# Patient Record
Sex: FEMALE | Race: WHITE
[De-identification: names, ages, dates, MRNs, and addresses within clinical notes are randomized per-mention and may not be internally consistent; named-entity substitution may affect disease eponyms.]

---

## 2018-08-24 ENCOUNTER — HOSPITAL ENCOUNTER (INPATIENT)
Dept: HOSPITAL 68 - ERH | Age: 73
LOS: 3 days | DRG: 176 | End: 2018-08-27
Attending: INTERNAL MEDICINE | Admitting: HOSPITALIST
Payer: COMMERCIAL

## 2018-08-24 VITALS — WEIGHT: 166 LBS | BODY MASS INDEX: 29.41 KG/M2 | HEIGHT: 63 IN

## 2018-08-24 DIAGNOSIS — M81.0: ICD-10-CM

## 2018-08-24 DIAGNOSIS — E66.9: ICD-10-CM

## 2018-08-24 DIAGNOSIS — G47.33: ICD-10-CM

## 2018-08-24 DIAGNOSIS — E11.9: ICD-10-CM

## 2018-08-24 DIAGNOSIS — F32.9: ICD-10-CM

## 2018-08-24 DIAGNOSIS — I10: ICD-10-CM

## 2018-08-24 DIAGNOSIS — F41.9: ICD-10-CM

## 2018-08-24 DIAGNOSIS — K21.9: ICD-10-CM

## 2018-08-24 DIAGNOSIS — I26.99: Primary | ICD-10-CM

## 2018-08-24 LAB
ABSOLUTE GRANULOCYTE CT: 3.2 /CUMM (ref 1.4–6.5)
APTT BLD: 30 SEC (ref 25–37)
BASOPHILS # BLD: 0 /CUMM (ref 0–0.2)
BASOPHILS NFR BLD: 0.3 % (ref 0–2)
EOSINOPHIL # BLD: 0.1 /CUMM (ref 0–0.7)
EOSINOPHIL NFR BLD: 1.5 % (ref 0–5)
ERYTHROCYTE [DISTWIDTH] IN BLOOD BY AUTOMATED COUNT: 13.2 % (ref 11.5–14.5)
GRANULOCYTES NFR BLD: 60.6 % (ref 42.2–75.2)
HCT VFR BLD CALC: 37.8 % (ref 37–47)
LYMPHOCYTES # BLD: 1.7 /CUMM (ref 1.2–3.4)
MCH RBC QN AUTO: 30.6 PG (ref 27–31)
MCHC RBC AUTO-ENTMCNC: 34.5 G/DL (ref 33–37)
MCV RBC AUTO: 88.6 FL (ref 81–99)
MONOCYTES # BLD: 0.3 /CUMM (ref 0.1–0.6)
PLATELET # BLD: 237 /CUMM (ref 130–400)
PMV BLD AUTO: 7.1 FL (ref 7.4–10.4)
PROTHROMBIN TIME: 11.8 SEC (ref 9.4–12.5)
RED BLOOD CELL CT: 4.27 /CUMM (ref 4.2–5.4)
WBC # BLD AUTO: 5.4 /CUMM (ref 4.8–10.8)

## 2018-08-24 NOTE — CT SCAN REPORT
EXAMINATION:
CT ANGIOGRAM OF THE CHEST WITH AND WITHOUT CONTRAST (CT PULMONARY ANGIOGRAM
FOR PE)
 
CLINICAL INFORMATION:
sob elevated d-dimer
 
COMPARISON:
CT chest 07/11/2016.
 
TECHNIQUE:
Prior to contrast administration, noncontrast localization images were
obtained. Subsequently, multidetector volumetric imaging was performed from
the thoracic inlet to below the diaphragms following the administration of 95
mL Optiray 320 intravenous contrast.
No contrast reaction reported.
Sagittal, coronal, and MIP oblique sagittal reformatted images were obtained
on the CT workstation, uploaded to PACS, and reviewed.
Total exam dose-length product 329.4 mGy-cm.
 
FINDINGS:
 
QUALITY OF STUDY/CONTRAST BOLUS: Satisfactory
 
PULMONARY ARTERIES: There is bilateral pulmonary emboli. There is large
emboli at the distal left main pulmonary artery with extension into secondary
tertiary and peripheral branches of the upper lobe lingula and lower lobe.
There is also a large pulmonary embolus of the distal right main pulmonary
artery extending into the upper lobe middle lobe and lower lobe.
 
THORACIC AORTA: No aneurysm or dissection.
 
LUNG: No focal consolidation, nodules or masses.
 
PLEURA: No pleural effusion or pneumothorax.
 
MEDIASTINUM: Normal heart size. No pericardial effusion. No hilar or
mediastinal lymphadenopathy. No evidence of septal bowing or right heart
strain.
 
CHEST WALL/AXILLA: No axillary or internal mammary lymphadenopathy.
 
OSSEOUS STRUCTURES: Patient's had prior thoracoplasty of T8 and T11.
Orthopedic plate and screw present at the lower cervical spine. There is
multilevel degenerative spondylosis of the dorsal spine.
 
UPPER ABDOMEN: Sharply marginated 1.4 cm hypodensity in the right lobe of
liver and a 0.9 cm hypodensity in right lobe liver. Both of these remain
unchanged since exam 07/11/2016 likely hepatic cysts.
No reflux of contrast into the hepatic veins to suggest elevated right heart
pressures.
 
IMPRESSION:
Bilateral pulmonary emboli.
 
VTE: positive.
 
This critical result was discussed with Dr. Edmonds on 08/24/2018, 8:05 PM
and it was ascertained that the content and urgency of the report was
understood at the time of direct communication.

## 2018-08-24 NOTE — ED GENERAL ADULT
**See Addendum**
History of Present Illness
 
General
Chief Complaint: Chest Pain
Stated Complaint: CP
Source: patient
Exam Limitations: no limitations
 
Vital Signs & Intake/Output
Vital Signs & Intake/Output
 Vital Signs
 
 
Date Time Temp Pulse Resp B/P B/P Pulse O2 O2 Flow FiO2
 
     Mean Ox Delivery Rate 
 
08/24 2200  75 20 147/81  95 Room Air  
 
08/24 2000  72 18 184/78  96 Room Air  
 
08/24 1700       Nasal 2.0L 
 
       Cannula  
 
08/24 1700 98.3 76 20 173/85  95 Room Air  
 
 
 ED Intake and Output
 
 
 08/25 0000 08/24 1200
 
Intake Total 0 
 
Output Total  
 
Balance 0 
 
   
 
Intake, Oral 0 
 
Patient 165 lb 
 
Weight  
 
Weight Bed scale 
 
Measurement  
 
Method  
 
 
 
Allergies
Coded Allergies:
ACE Inhibitors (COUGH 08/24/18)
bee venom protein (honey bee) (WELTS, DYSPNEA 08/24/18)
 
Triage Note:
PT BIBA FROM PCP. PT C/O INCREASED
ANXIETY X2 WEEKS WITH ASSOCIATED SHORTNESS OF
BREATH AND CHEST PAIN. PT REPORTS WORSENING
SYMPTOMS TODAY WHILE PUMPING GAS. DENIES ANY OTHER
SYMPTOMS. REPORTS HX OF SIMILAR. PT RECEIVED 324
ASA FROM EMS. 2 L NC. PT REPORTS IMPROVEMENT OF
SYMPTOMS ON ARRIVAL
Triage Nurses Notes Reviewed? yes
Onset: Abrupt
Duration: hour(s):
Timing: single episode today
HPI:
72 year old female presents to the Emergency Department for labored breathing 
which gets worse with excertion. She states this has been going on for a few 
weeks. She also is complaining of chest pain.   states she has had a 
cough that has been on going for 1 year. She has a history of anxiety.
(Jason Erickson DO)
Reconcile Medications
Ascorbate Calcium (Vitamin C) (Unknown Strength) TABLET   (Unknown Dose) PO 
DAILY SUPPLEMENT  (Reported)
Calcium Carbonate/Vitamin D3 (Calcium 500 + D Tablet) (Unknown Strength) TABLET 
 (Unknown Dose) PO DAILY SUPPLEMENT  (Reported)
Dexlansoprazole (Dexilant) 60 MG CAP.DRAtlonBP   1 CAP PO BID GI  (Reported)
Diclofenac Sodium 75 MG TABLET.DR   1 TAB PO BID PAIN/INFLAMMATION  (Reported)
Epinephrine (Epipen 2-Campos) 0.3 MG/0.3 ML AUTO.INJCT   0.3 MG IM AD PRN ALLERGIC 
REACTION  (Reported)
Fluoxetine HCl 60 MG TABLET   1 TAB PO DAILY DEPRESSION  (Reported)
Irbesartan (Avapro) 300 MG TABLET   1 TAB PO DAILY BP  (Reported)
Nebivolol HCl (Bystolic) 10 MG TABLET   1 TAB PO DAILY BP  (Reported)
Tylenol With Codeine (Tylenol With Codeine #3 Tablet) 300 MG-30 MG TABLET   1 
TAB PO BIDP PRN PAIN  (Reported)
 
(Darby OROZCO,Curtis ARRIOLA)
 
Past History
 
Travel History
Traveled to [place] past 21 day No
 
Medical History
Any Pertinent Medical History? see below for history
Neurological: COGNITIVE IMPAIRMENT TREMOR DYSPHAGIA
EENT: NONE
Cardiovascular: hypertension
Respiratory: obstructive sleep apnea
Gastrointestinal: diverticulitis, GERD
Hepatic: NONE
Renal: NONE
Musculoskeletal: osteoporosis, ARTHRITIS
Psychiatric: anxiety, depression
Endocrine: NONE
Blood Disorders: NONE
Cancer(s): NONE
GYN/Reproductive: NONE
 
Surgical History
Surgical History: non-contributory
 
Psychosocial History
Who do you live with Spouse
Services at Home None
What is your primary language English
Tobacco Use: Never used
 
Family History
Hx Contributory? No
(Jason Erickson DO)
 
Review of Systems
 
Review of Systems
Constitutional:
Reports: see HPI.  Denies: fever. 
EENTM:
Reports: no symptoms.  Denies: double vision, visual changes. 
Respiratory:
Reports: see HPI, cough, short of breath.  Denies: hemoptysis. 
Cardiovascular:
Reports: see HPI, chest pain.  Denies: edema. 
GI:
Reports: no symptoms. 
Genitourinary:
Reports: no symptoms. 
Musculoskeletal:
Reports: no symptoms. 
Skin:
Reports: no symptoms. 
Neurological/Psychological:
Reports: no symptoms. 
Hematologic/Endocrine:
Reports: no symptoms. 
Immunologic/Allergic:
Reports: no symptoms. 
All Other Systems: Reviewed and Negative
(Jason Erickson DO)
 
Physical Exam
 
Physical Exam
General Appearance: alert, awake, anxious, mild distress
Head: atraumatic, normal appearance
Eyes:
Bilateral: normal appearance, PERRL, EOMI. 
Ears, Nose, Throat: normal ENT inspection
Neck: normal inspection
Respiratory: no respiratory distress, lungs clear
Cardiovascular: regular rate/rhythm
Peripheral Pulses:
4+ radial (R), 4+ radial (L)
Gastrointestinal: soft, non-tender
Back: normal inspection
Extremities: normal inspection, no edema
Neurologic/Psych: no motor/sensory deficits, awake, alert, oriented x 3
Skin: intact, normal color, warm/dry
 
Core Measures
ACS in differential dx? No
CVA/TIA Diagnosis: No
Sepsis Present: No
Sepsis Focused Exam Completed? No
(Jason Erickson DO)
 
Physical Exam
Rectal: normal exam, heme negative stool
(Darby OROZCO,Curtis ARRIOLA)
 
Progress
Differential Diagnoses
I considered the following diagnoses in my evaluation of the patient: 
 
Plan of Care:
 Orders
 
 
Procedure Date/time Status
 
Regular Diet 08/25 B Active
 
PARTIAL THROMBOPLASTIN TIME 08/25 0200 Active
 
Weight 08/24 2256 Active
 
Vital Signs 08/24 2256 Active
 
Teach/Educate 08/24 2256 Active
 
Pain Treatment and Response 08/24 2256 Active
 
Nutritional Intake, Monitor 08/24 2256 Active
 
Isolation 08/24 2256 Active
 
Intake & Output 08/24 2256 Active
 
Heparin Drip- AFIB/FLUTTER/PE/ 08/24 2256 Active
 
Patient Care Conference 08/24 2256 Active
 
Activity/Ambulation 08/24 2256 Active
 
Pathway - chart 08/24 2203 Active
 
House Staff 08/24 2203 Active
 
Code Status 08/24 2203 Active
 
Patient Data 08/24 2111 Active
 
Saline Lock 08/24 2106 Active
 
Misc Message 08/24 2106 Active
 
ED Holding Orders 08/24 2106 Active
 
Admit to inpatient 08/24 2106 Active
 
Vital Signs 08/24 2106 Active
 
Code Status 08/24 2106 Complete
 
ECHOCARDIOGRAM 08/24 2105 Active
 
TROPONIN LEVEL 08/24 2100 Complete
 
EKG 08/24 2100 Active
 
Add-on Test (ER Only) 08/24 2005 Active
 
Add-on Test (ER Only) 08/24 1756 Active
 
TROPONIN LEVEL 08/24 1715 Complete
 
PARTIAL THROMBOPLASTIN TIME 08/24 1715 Complete
 
PROTHROMBIN TIME 08/24 1715 Complete
 
D-DIMER 08/24 1715 Complete
 
COMPREHENSIVE METABOLIC PANEL 08/24 1715 Complete
 
CBC WITHOUT DIFFERENTIAL 08/24 1715 Complete
 
EKG 08/24 1700 Active
 
Intake & Output 08/24 1657 Active
 
Telemetry/Cardiac Monitor 08/24  UNK Active
 
Activity/Ambulation 08/24  UNK Active
 
 
 Current Medications
 
 
  Sig/Marcia Start time  Last
 
Medication Dose  Stop Time Status Admin
 
Fluoxetine HCl 60 MG DAILY 08/25 0900 AC 
 
(Prozac)     
 
Losartan Potassium 100 MG DAILY 08/25 0900 AC 
 
(Cozaar)     
 
Nebivolol 10 MG DAILY 08/25 0900 AC 
 
(Bystolic)     
 
Omeprazole 40 MG BID 08/25 0900 AC 
 
(Prilosec)     
 
Acetaminophen 650 MG Q6PRN PRN 08/24 2200 AC 
 
(Tylenol)     
 
Heparin Sodium  25,000 UNIT Q24H 1945 AC 08/24
 
(Porcine)     2003
 
(Heparin)     
 
Sodium Chloride 500 ML    
 
 
 Laboratory Tests
 
 
 
08/24/18 2124:
Troponin I < 0.01
 
08/24/18 1715:
Anion Gap 8, Estimated GFR > 60, BUN/Creatinine Ratio 23.8, Glucose 104  H, 
Calcium 9.5, Total Bilirubin 0.3, AST 23, ALT 34, Alkaline Phosphatase 83, 
Troponin I < 0.01, Total Protein 7.2, Albumin 4.1, Globulin 3.1, Albumin/
Globulin Ratio 1.3, PT 11.8, INR 1.08, APTT 30, D-Dimer High Sensitivty 2052  H,
CBC w Diff NO MAN DIFF REQ, RBC 4.27, MCV 88.6, MCH 30.6, MCHC 34.5, RDW 13.2, 
MPV 7.1  L, Gran % 60.6, Lymphocytes % 31.9, Monocytes % 5.7, Eosinophils % 1.5,
Basophils % 0.3, Absolute Granulocytes 3.2, Absolute Lymphocytes 1.7, Absolute 
Monocytes 0.3, Absolute Eosinophils 0.1, Absolute Basophils 0
 
Initial ED EKG: NSR, LVH
Prior EKG: unchanged
Comments:
 
 
 
 
The patient had an elevated d-dimer and CTA was ordered to rule out pulmonary 
embolism.  The patient was signed out to Dr. Pastor at 7 PM.
(Jason Erickson DO)
 
Departure
 
Departure
Condition: Stable
Referrals:
Adrian OROZCO,Jairo SHARP (PCP/Family)
 
Departure Forms:
Customer Survey
General Discharge Information
(Jason Erickson DO)
 
Departure
Disposition: STILL A PATIENT
Clinical Impression
Primary Impression: Pulmonary emboli
Comments
8/24/18, 20:06... discussed with [facility] regarding presence of right
heart strain... radiologist reports to me that there is bilateral clot burden 
distally at branching points of pulmonary arteries but no sign of right heart 
strain, no saddle pulmonary emboli.  Of note, patient has had symptoms for 3 
weeks.
 
Admission Note
Spoke With:
Herb Mckinley MD
Documentation of Exam:
Documentation of any treatments & extenuating circumstances including Concerns 
Regarding Discharge (functional status, medication knowledge or non-compliance, 
living conditions, etc.) that warrant an admission rather than observation: 
 
pt with bilateral PE without right heart strain, stable on room air with stable 
vitals. comfortable in ED. ... pt merits tele, iv heparin.  
 
(Darby OROZCO,Curtis ARRIOLA)
 
Critical Care Note
 
Critical Care Note
Critical Care Time: non-applicable
(Jason Erickson DO)
 
Critical Care Note
Critical Care Time: 30-74 min
(Darby OROZCO,Curtis ARRIOLA)

## 2018-08-24 NOTE — HISTORY & PHYSICAL
Rafael Yu 08/24/18 2111:
General Information and HPI
MD Statement:
I have seen and personally examined DONY NAPIER and documented this H&P.
 
The patient is a 72 year old F who presented with a patient stated chief 
complaint of [progressive shortness of breath on exertion x 4 weeks].
 
Source of Information: patient, old records
Exam Limitations: no limitations
History of Present Illness:
Mrs Napier is a 72F with a PMH of cognitive impairment, hypertension, obesity
, diabetes mellitus, GERD, osteoporosis, OA, anxiety, depression presents with 
shortness of breath on exertion x 4 weeks progressively worsening. Pt states 
that over the past year she has had a dry nonproductive cough, which worsened in
association with progressively worsening shortness of breath. States that she 
also has been experiencing chest pain that radiates into bilateral shoulder x 4 
weeks. Denies any inciting incident, rather woke up one day and noticed that she
felt more out of breath when walking up the stairs. States most recently she was
at a concert thursday night, when she became out of breath walking to a 
[place] and back, roughly fifty feet. At baseline, she can climb several 
flights of stairs or walk >5 city blocks without shortness of breath. Recently, 
pt and her  flew to [place] 1 week ago, approx 1.5 hour flight but has 
not had any recent periods of immobilization. Had epidural shot approx 5 weeks 
ago. No recent surgery. Denies any recent sick contacts. 
 
She denies fevers, chills, recent weight loss, decreased appetite, palpitations,
hemoptysis, shortness of breath at rest,  nausea/vomiting/diarrhea, urinary 
symptoms, lower extremity edema
 
She admits to worsened headaches, night sweats and ?stomach aches.
 
 
 
Allergies/Medications
Allergies:
Coded Allergies:
ACE Inhibitors (COUGH 08/24/18)
bee venom protein (honey bee) (WELTS, DYSPNEA 08/24/18)
 
 
Past History
 
Travel History
Traveled to [place] past 21 day No
 
Medical History
Neurological: COGNITIVE IMPAIRMENT TREMOR DYSPHAGIA
EENT: NONE
Cardiovascular: hypertension
Respiratory: obstructive sleep apnea
Gastrointestinal: diverticulitis, GERD
Hepatic: NONE
Renal: NONE
Musculoskeletal: osteoporosis, ARTHRITIS
Psychiatric: anxiety, depression
Endocrine: NONE
Blood Disorders: NONE
Cancer(s): NONE
GYN/Reproductive: NONE
 
Surgical History
Surgical History: non-contributory
 
Past Family/Social History
 
Psychosocial History
Services at Home: None
Smoking Status: Never Smoked
ETOH Use: occasional use
Illicit Drug Use: denies illicit drug use
 
Functional Ability
ADLs
Independent: dressing, eating, toileting, bathing. 
Ambulation: independent
IADLs
Independent: shopping, housework, finances, food prep, telephone, transportation
, medication admin. 
 
Employment History
Employment Retired
Profession/Employer owned a retail store
 
Review of Systems
 
Review of Systems
Constitutional:
Reports: see HPI, weakness.  Denies: diaphoresis, fever, malaise, unexplained 
weight loss. 
EENTM:
Denies: blurred vision, double vision, visual changes. 
Cardiovascular:
Reports: chest pain.  Denies: edema, orthopena, palpitations, peripheral edema. 
Respiratory:
Reports: cough, short of breath.  Denies: hemoptysis, sputum production, 
wheezing. 
GI:
Reports: abdominal pain.  Denies: bloating, constipation, diarrhea, distention, 
bowel incontinence, melena, nausea, bloody stool. 
Genitourinary:
Reports: no symptoms. 
 
Exam & Diagnostic Data
Last 24 Hrs of Vital Signs/I&O
 Vital Signs
 
 
Date Time Temp Pulse Resp B/P B/P Pulse O2 O2 Flow FiO2
 
     Mean Ox Delivery Rate 
 
08/24 2200  75 20 147/81  95 Room Air  
 
08/24 2000  72 18 184/78  96 Room Air  
 
08/24 1700       Nasal 2.0L 
 
       Cannula  
 
08/24 1700 98.3 76 20 173/85  95 Room Air  
 
 
 
 
Physical Exam
General Appearance Alert, Oriented X3, Cooperative, No Acute Distress
Skin No Rashes
Skin Temp/Moisture Exam: Warm/Dry
Neck No JVD
Cardiovascular Regular Rate, Normal S1, Normal S2
Lungs Clear to Auscultation, Normal Air Movement
Abdomen Soft, No Tenderness
Neurological Normal Speech, Strength at 5/5 X4 Ext, Sensation Intact
Extremities No Edema, Normal Pulses
Vascular Pulses Symmetrical
Last 24 Hrs of Labs/Andre:
 Laboratory Tests
 
08/24/18 2124:
Troponin I < 0.01
 
08/24/18 1715:
Anion Gap 8, Estimated GFR > 60, BUN/Creatinine Ratio 23.8, Glucose 104  H, 
Calcium 9.5, Total Bilirubin 0.3, AST 23, ALT 34, Alkaline Phosphatase 83, 
Troponin I < 0.01, Total Protein 7.2, Albumin 4.1, Globulin 3.1, Albumin/
Globulin Ratio 1.3, PT 11.8, INR 1.08, APTT 30, D-Dimer High Sensitivty 2052  H,
CBC w Diff NO MAN DIFF REQ, RBC 4.27, MCV 88.6, MCH 30.6, MCHC 34.5, RDW 13.2, 
MPV 7.1  L, Gran % 60.6, Lymphocytes % 31.9, Monocytes % 5.7, Eosinophils % 1.5,
Basophils % 0.3, Absolute Granulocytes 3.2, Absolute Lymphocytes 1.7, Absolute 
Monocytes 0.3, Absolute Eosinophils 0.1, Absolute Basophils 0
 
 
Assessment/Plan
Assessment:
Mrs Napier is a 72F with PMH cognitive impairment, hypertension, obesity, 
diabetes mellitus, GERD, osteoporosis, OA, anxiety, depression presents with 
shortness of breath on exertion worsening x4 weeks. She had an elevated d-dimer 
of 2052, and was found to have bilateral pulmonary emboli on CTA chest. She is 
up to date on most of her screening exams, however has not had any recent long 
term immobilization or risk factors for PE.
 
Problem List:
Unprovoked PE 
Chronic Medical Conditions
 
#B/L Pulmonary Embolus
-Admit to tele
-On IV heparin drip
-Trop and EKG negative x2; EKG and Trop at 3am
-Echo for R heart strain in AM
-On 2L with sats at 95%; taper accordingly
-B/L lower extremity doppler negative
 
#Chronic medical conditions
-Continue home meds
 
DVT PPX: ALPS and Heparin
IV Access
Regular Diet
Full Code
Dispo: likely to home on long term anticoagulation
 
As Ranked By This Provider
Problem List:
 1. Pulmonary emboli
 
 
Core Measures/Misc (9/17)
 
Acute Coronary Syndrome
ACS Diagnosis: No
 
Congestive Heart Failure
Congestive Heart Failure Diagnosis No
 
Cerebrovascular Accident
CVA/TIA Diagnosis: No
 
VTE (View Protocol)
VTE Risk Factors Age>40
No Mechanical VTE Prophylaxis d/t N/A MechProphylax Ordered
No VTE Pharm Prophylaxis d/t NA PharmProphylax ordered
 
Sepsis (View protocol)
Sepsis Present: No
If YES complete Sepsis Event Note If YES complete Sepsis Event Note
 
Avery OROZCO,Coty 08/24/18 2114:
General Information and HPI
 
Allergies/Medications
Home Med list
Ascorbate Calcium (Vitamin C) (Unknown Strength) TABLET   (Unknown Dose) PO 
DAILY SUPPLEMENT  (Reported)
Calcium Carbonate/Vitamin D3 (Calcium 500 + D Tablet) (Unknown Strength) TABLET 
 (Unknown Dose) PO DAILY SUPPLEMENT  (Reported)
Dexlansoprazole (Dexilant) 60 MG CAP.DR.BP   1 CAP PO BID GI  (Reported)
Diclofenac Sodium 75 MG TABLET.DR   1 TAB PO BID PAIN/INFLAMMATION  (Reported)
Epinephrine (Epipen 2-Campos) 0.3 MG/0.3 ML AUTO.INJCT   0.3 MG IM AD PRN ALLERGIC 
REACTION  (Reported)
Fluoxetine HCl 60 MG TABLET   1 TAB PO DAILY DEPRESSION  (Reported)
Irbesartan (Avapro) 300 MG TABLET   1 TAB PO DAILY BP  (Reported)
Nebivolol HCl (Bystolic) 10 MG TABLET   1 TAB PO DAILY BP  (Reported)
Tylenol With Codeine (Tylenol With Codeine #3 Tablet) 300 MG-30 MG TABLET   1 
TAB PO BIDP PRN PAIN  (Reported)
 
 
 
Core Measures/Misc (9/17)
 
Sepsis (View protocol)
If YES complete Sepsis Event Note If YES complete Sepsis Event Note
 
Resident Review Statement
Resident Statement: examined this patient, discussed with intern, agreed with 
intern, discussed with family, reviewed EMR data (avail), discussed with nursing
, reviewed images
Other Findings:
Ms. Napier is a 72-year-old lady with past history significant cognitive 
impairment, hypertension, obesity, diabetes mellitus, GERD, osteoporosis, OA, 
anxiety, depression presents with shortness of breath.
Patient reports shortness of breath that has been going on for the past 5 weeks.
SOB is mostly exertional.  Has a chronic dry cough which has been extensively 
worked up in the past.  She also complains of chest pain started the symptoms 
shortness of breath.  Denies any lightheadedness/dizziness, palpitations, or 
syncopal episodes. Patient had a flight to [place] a week ago(1.5 +1.5 hrs 
connecting flight).  Also denies any weight loss. Has been up to date with all 
her screenings(colonoscopy, mammograms, pap smears).  She has never smoked in 
the past and does not have any family hx of blood clots or cancers. 
 
Vitals on admission were temperature 98.3, heart rate 76, respiratory rate 20, 
/85 and O2 sats 95% on room air.
 
Labs were significant for d-dimer of 2052, CPC and BP are unremarkable.
 
CTA chest was done showing bilateral pulmonary emboli.
 
Problem List;
1. Unprovoked PE
2. Chronic medical conditions
 
- Admit the patient to telemetry floor
- Start the patient on IV heparin drip
- Bilateral lower extremity ultrasound to rule out any DVT
- Troponin and EKG x 3
- Echocardiogram to look for right heart strain
- Patient currently on 2 L of supplemental oxygen, will continue and taper off 
gradually.
- Continue home medication
 
DVT Prophylaxis; ALPS and IV heparin
Patient is full code.
 
Herb Mckinley MD 08/25/18 0416:
General Information and HPI
MD Statement:
I have seen and personally examined DONY NAPIER and documented this H&P.
 
The patient is a 72 year old F who presented with a patient stated chief 
complaint of [].
 
 
 
Core Measures/Misc (9/17)
 
Sepsis (View protocol)
If YES complete Sepsis Event Note If YES complete Sepsis Event Note
 
Attending MD Review Statement
 
Attending Statement
Attending MD Statement: examined this patient, discuss w/resident/PA/NP, agreed 
w/resident/PA/NP
Attending Assessment/Plan:
72 year-old female presents with gradual onset dyspnea, acutely worse in the 
last 24 hours leading up to presentation. History of cognitive impairment, 
obesity, and diabetes. Oxygenation was stable, as was heart rate, but D-dimer 
was significantly elevated, leading to a CT chest which showed a fairly 
extensive clot burden, occluding the left main pulmonary artery with extension 
distally into smaller branches; also involving the distal right main pulmonary 
artery. Hepatic cysts noted on the liver with stability from about 2 years 
prior. No clear inciting etiology for the PE that by history would seem to have 
started to become symptomatic approximately 2 weeks ago. No pleurisy or 
hemoptysis. 
Admit to inpatient for treatment of unprovoked, extensive PE with IV heparin, 
telemetry monitoring, and given this degree of clot burden, would prefer to 
check Doppler of the legs in this case. An echo might delineate any degree of 
right heart strain not picked up by CT/EKG. May be a candidate for NOAC as 
outpatient. 
 
Dr. Herb Mckinley

## 2018-08-24 NOTE — RADIOLOGY REPORT
EXAMINATION:
XR PORTABLE CHEST
 
CLINICAL INFORMATION:
Chest pain.
 
COMPARISON:
Chest x-ray 08/09/2018
 
TECHNIQUE:
Portable frontal view of the chest was obtained. 5:09 PM
 
FINDINGS:
The lungs are clear. There is no pulmonary vascular congestion. There is no
pleural effusion. The cardiac and mediastinal contours are normal. The heart
size is normal.
 
Orthopedic plate and screw present in the lower cervical spine.
 
IMPRESSION:
No acute abnormality of the chest.

## 2018-08-24 NOTE — ULTRASOUND REPORT
EXAMINATION:
US TRIPLEX OF LOWER EXTREMITIES, BILATERAL
 
CLINICAL INFORMATION:
Bilateral pulmonary emboli.
 
COMPARISON:
None
 
TECHNIQUE:
Color-flow triplex imaging with spectral analysis and compression Doppler
were performed on the lower extremities.
 
FINDINGS:
Respiratory variation, normal compression and augmented flow are noted
throughout the lower extremities. The visualized common femoral vein,
superficial femoral vein, profunda femoral vein, popliteal vein and midcalf
peroneal and posterior tibial venous segments show no evidence of deep venous
thrombosis. There is no Baker's cyst.
 
IMPRESSION:
No evidence of deep venous thrombosis involving the bilateral lower
extremities.

## 2018-08-25 VITALS — DIASTOLIC BLOOD PRESSURE: 72 MMHG | SYSTOLIC BLOOD PRESSURE: 120 MMHG

## 2018-08-25 VITALS — SYSTOLIC BLOOD PRESSURE: 146 MMHG | DIASTOLIC BLOOD PRESSURE: 80 MMHG

## 2018-08-25 VITALS — SYSTOLIC BLOOD PRESSURE: 140 MMHG | DIASTOLIC BLOOD PRESSURE: 80 MMHG

## 2018-08-25 LAB
APTT BLD: 74 SEC (ref 25–37)
APTT BLD: > 120 SEC (ref 25–37)

## 2018-08-25 NOTE — PN- HOUSESTAFF
Avery OROZCO,[facility] 18 0549:
Subjective
Follow-up For:
Bilateral unprovoked PE
Subjective:
Patient feels better this am, does not have any SOB even though has not walked 
around much yet. Continues to have mild chest pain. Had bad back last night 
improved wit tylenol. 
 
Review of Systems
Constitutional:
Reports: no symptoms. 
EENTM:
Reports: no symptoms. 
Cardiovascular:
Reports: chest pain. 
Respiratory:
Reports: short of breath. 
Gastrointestinal:
Reports: no symptoms. 
Genitourinary:
Reports: no symptoms. 
Musculoskeletal:
Reports: no symptoms. 
 
Objective
Last 24 Hrs of Vital Signs/I&O
 Vital Signs
 
 
Date Time Temp Pulse Resp B/P B/P Pulse O2 O2 Flow FiO2
 
     Mean Ox Delivery Rate 
 
 0600 98.4 78 20 120/72  96 Room Air  
 
 0000       Room Air  
 
 2200  75 20 147/81  95 Room Air  
 
 2000  72 18 184/78  96 Room Air  
 
 1700       Nasal 2.0L 
 
       Cannula  
 
 170 98.3 76 20 173/85  95 Room Air  
 
 
 Intake & Output
 
 
  08 0000  1600
 
Intake Total 240 0 
 
Output Total   
 
Balance 240 0 
 
    
 
Intake,   
 
Intake, Oral 120 0 
 
Patient  165 lb 
 
Weight   
 
Weight  Bed scale 
 
Measurement   
 
Method   
 
 
 
 
Physical Exam
General Appearance: Alert, Oriented X3, Cooperative, No Acute Distress
Skin: No Rashes, No Breakdown
Cardiovascular: Regular Rate, Normal S1, Normal S2
Lungs: Clear to Auscultation, Normal Air Movement
Abdomen: Normal Bowel Sounds, Soft, No Tenderness
Extremities: No Clubbing, No Cyanosis, No Edema
Current Medications:
 Current Medications
 
 
  Sig/Marcia Start time  Last
 
Medication Dose Route Stop Time Status Admin
 
Acetaminophen 650 MG Q6PRN PRN 2200 AC 
 
  PO   0457
 
Fluoxetine HCl 60 MG DAILY 900 AC 
 
  PO   
 
Heparin Sodium  0 .STK-MED ONE 1949 DC 
 
(Porcine)  .ROUTE   
 
Heparin Sodium  5,000 UNIT ONCE ONE 1945 DC 
 
(Porcine)  IV 
 
Heparin Sodium  25,000 UNIT Q24H 1945 AC 
 
(Porcine)  IV   
 
Sodium Chloride 500 ML    
 
Losartan Potassium 100 MG DAILY 900 AC 
 
  PO   
 
Nebivolol 10 MG DAILY 900 AC 
 
  PO   
 
Omeprazole 40 MG BID 08/25 0900 AC 
 
  PO   
 
 
 
 
Last 24 Hrs of Lab/Andre Results
Last 24 Hrs of Labs/Mics:
 Laboratory Tests
 
18 0617:
Troponin I Pending
 
18 0200:
APTT > 120 *H
 
184:
Troponin I < 0.01
 
18 1715:
Anion Gap 8, Estimated GFR > 60, BUN/Creatinine Ratio 23.8, Glucose 104  H, 
Calcium 9.5, Total Bilirubin 0.3, AST 23, ALT 34, Alkaline Phosphatase 83, 
Troponin I < 0.01, Total Protein 7.2, Albumin 4.1, Globulin 3.1, Albumin/
Globulin Ratio 1.3, PT 11.8, INR 1.08, APTT 30, D-Dimer High Sensitivty   H,
CBC w Diff NO MAN DIFF REQ, RBC 4.27, MCV 88.6, MCH 30.6, MCHC 34.5, RDW 13.2, 
MPV 7.1  L, Gran % 60.6, Lymphocytes % 31.9, Monocytes % 5.7, Eosinophils % 1.5,
Basophils % 0.3, Absolute Granulocytes 3.2, Absolute Lymphocytes 1.7, Absolute 
Monocytes 0.3, Absolute Eosinophils 0.1, Absolute Basophils 0
 
 
Assessment/Plan
Assessment:
Ms. Martin is a 72-year-old lady with past history significant cognitive 
impairment, hypertension, obesity, diabetes mellitus, GERD, osteoporosis, OA, 
anxiety, depression presents with shortness of breath.
 
Problem List;
1.  Bilateral unprovoked PE
2. Chronic medical conditions
 
- Continue IV heparin drip
- Bilateral lower extremity ultrasound to rule out any DVT - negative
- Troponin and EKG x 2, Negative. 3rd set pending
- Echocardiogram to look for right heart strain - pending
- Off supplemental oxygen.
- Continue home medication.
 
DVT Prophylaxis; ALPS and IV heparin
Patient is full code.
Problem List:
 1. Pulmonary emboli
 
Pain Ratin
Pain Location:
Chest
Pain Goal: Remain pain free
Pain Plan:
Pain Pathway
Tomorrow's Labs & Rationales:
CBC(on IV heparin)
 
 
Colby OROZCO,Robin 18 1204:
Attending MD Review Statement
 
Attending Statement
Attending MD Statement: examined this patient, discuss w/resident/PA/NP, agreed 
w/resident/PA/NP, reviewed EMR data (avail), discussed with nursing, amended to 
note
Attending Assessment/Plan:
Patient seen and examined.  Resting comfortably not in any acute distress.  She 
is very pleasant lady and offers no complaints at this time.  Denies chest pain 
or shortness of breath.  Denies palpitations.  Her age-appropriate cancer 
screening in the outpatient included a negative mammogram and a colonoscopy.  
She was noted to have polyps on the colonoscopy about 4 years ago and is 
scheduled to have a repeat done next year.
On examination heart sounds are regular.  Lungs are clear bilaterally.  Abdomen 
soft and nontender.  She has no peripheral edema.  She has had no events on 
telemetry monitoring.  She is not requiring oxygen supplementation.  She is not 
short of breath at rest.
 
Recommendations:
-Transition patient to oral anticoagulant therapy with Eliquis.
-If she remains hemodynamically stable overnight she may be discharged home 
tomorrow.
-Would recommend obtaining CT abdomen pelvis while she is here to rule out any 
occult malignancy.
-Follow-up echocardiogram to evaluate for any evidence of right heart strain.
-She is to follow-up with the oncology service as an outpatient for pro-
thrombotic workup.

## 2018-08-26 VITALS — DIASTOLIC BLOOD PRESSURE: 76 MMHG | SYSTOLIC BLOOD PRESSURE: 158 MMHG

## 2018-08-26 VITALS — SYSTOLIC BLOOD PRESSURE: 142 MMHG | DIASTOLIC BLOOD PRESSURE: 84 MMHG

## 2018-08-26 VITALS — DIASTOLIC BLOOD PRESSURE: 70 MMHG | SYSTOLIC BLOOD PRESSURE: 134 MMHG

## 2018-08-26 VITALS — DIASTOLIC BLOOD PRESSURE: 94 MMHG | SYSTOLIC BLOOD PRESSURE: 158 MMHG

## 2018-08-26 LAB
ABSOLUTE GRANULOCYTE CT: 1.9 /CUMM (ref 1.4–6.5)
BASOPHILS # BLD: 0 /CUMM (ref 0–0.2)
BASOPHILS NFR BLD: 0.3 % (ref 0–2)
EOSINOPHIL # BLD: 0.1 /CUMM (ref 0–0.7)
EOSINOPHIL NFR BLD: 2.7 % (ref 0–5)
ERYTHROCYTE [DISTWIDTH] IN BLOOD BY AUTOMATED COUNT: 13.6 % (ref 11.5–14.5)
GRANULOCYTES NFR BLD: 51.9 % (ref 42.2–75.2)
HCT VFR BLD CALC: 35.3 % (ref 37–47)
LYMPHOCYTES # BLD: 1.5 /CUMM (ref 1.2–3.4)
MCH RBC QN AUTO: 30.4 PG (ref 27–31)
MCHC RBC AUTO-ENTMCNC: 34.3 G/DL (ref 33–37)
MCV RBC AUTO: 88.4 FL (ref 81–99)
MONOCYTES # BLD: 0.2 /CUMM (ref 0.1–0.6)
PLATELET # BLD: 241 /CUMM (ref 130–400)
PMV BLD AUTO: 7.4 FL (ref 7.4–10.4)
RED BLOOD CELL CT: 3.99 /CUMM (ref 4.2–5.4)
WBC # BLD AUTO: 3.8 /CUMM (ref 4.8–10.8)

## 2018-08-26 NOTE — CONS- CARDIOLOGY
General Information and HPI
 
Consulting Request
Date of Consult: 08/26/18
Requested By:
Herb Mckinley MD
 
Source of Information: patient
History of Present Illness:
72F with a PMH of  hypertension, diabetes mellitus, GERD, osteoporosis, OA, 
anxiety, Presenting to the ED for a 4 week history of worsening dyspnea with 
minimal exertion accompanied by episodes of chest pains radiating to the back.
Patient was found to have bilateral pulmonary embolisms and was started on 
heparin.
She has traveled by plane a week ago (1.5 hours each way) and had a lumbar 
injection 5 weeks ago but no significant immobilization/procedure/infection.
She still complains of intermittent chest pains radiating to the back, 
exacerbated by deep inspiration. Comfortable at rest otherwise. Troponins have 
been negative. No significant EKG changes.
Echocardiogram performed, i will read it shortly. 
 
Allergies/Medications
Allergies:
Coded Allergies:
ACE Inhibitors (COUGH 08/24/18)
bee venom protein (honey bee) (WELTS, DYSPNEA 08/24/18)
 
Home Med List:
Ascorbate Calcium (Vitamin C) (Unknown Strength) TABLET   (Unknown Dose) PO 
DAILY SUPPLEMENT  (Reported)
Calcium Carbonate/Vitamin D3 (Calcium 500 + D Tablet) (Unknown Strength) TABLET 
 (Unknown Dose) PO DAILY SUPPLEMENT  (Reported)
Dexlansoprazole (Dexilant) 60 MG CAP.DR.BP   1 CAP PO BID GI  (Reported)
Diclofenac Sodium 75 MG TABLET.DR   1 TAB PO BID PAIN/INFLAMMATION  (Reported)
Epinephrine (Epipen 2-Campos) 0.3 MG/0.3 ML AUTO.INJCT   0.3 MG IM AD PRN ALLERGIC 
REACTION  (Reported)
Fluoxetine HCl 60 MG TABLET   1 TAB PO DAILY DEPRESSION  (Reported)
Irbesartan (Avapro) 300 MG TABLET   1 TAB PO DAILY BP  (Reported)
Nebivolol HCl (Bystolic) 10 MG TABLET   1 TAB PO DAILY BP  (Reported)
Tylenol With Codeine (Tylenol With Codeine #3 Tablet) 300 MG-30 MG TABLET   1 
TAB PO BIDP PRN PAIN  (Reported)
 
Current Medications:
 Current Medications
 
 
  Sig/Marcia Start time  Last
 
Medication Dose Route Stop Time Status Admin
 
Acetaminophen 0 .STK-MED ONE 08/26 1107 DC 
 
  PO   
 
Acetaminophen 650 MG Q6PRN PRN 08/24 2200 AC 08/26
 
  PO   1105
 
Apixaban 5 MG BID 09/01 1040 AC 
 
  PO 10/01 1039  
 
Apixaban 10 MG BID 08/25 1040 AC 08/26
 
  PO 09/01 1039  1106
 
Fluoxetine HCl 60 MG DAILY 08/25 0900 AC 08/26
 
  PO   1106
 
Losartan Potassium 100 MG DAILY 08/25 0900 AC 08/26
 
  PO   1108
 
Nebivolol 10 MG DAILY 08/25 0900 AC 08/26
 
  PO   1109
 
 
 
 
Past History
 
Travel History
Traveled to [place] past 21 day No
 
Medical History
Neurological: COGNITIVE IMPAIRMENT TREMOR DYSPHAGIA
EENT: NONE
Cardiovascular: hypertension
Respiratory: obstructive sleep apnea
Gastrointestinal: diverticulitis, GERD
Hepatic: NONE
Renal: NONE
Musculoskeletal: osteoporosis, ARTHRITIS
Psychiatric: anxiety, depression
Endocrine: NONE
Blood Disorders: NONE
Cancer(s): NONE
GYN/Reproductive: NONE
 
Surgical History
Surgical History: non-contributory
 
Psychosocial History
Where Do You Live? Home
Services at Home: None
Smoking Status: Never Smoked
ETOH Use: occasional use
Illicit Drug Use: denies illicit drug use
 
Functional Ability
ADLs
Independent: dressing, eating, toileting, bathing. 
Ambulation: independent
IADLs
Independent: shopping, housework, finances, food prep, telephone, transportation
, medication admin. 
 
Employment History
Employment: Retired
Profession/Employer owned a retail store
 
Exam & Diagnostic Data
Vital Signs and I&O
Vital Signs
 
 
Date Time Temp Pulse Resp B/P B/P Pulse O2 O2 Flow FiO2
 
     Mean Ox Delivery Rate 
 
08/26 1109    154/76     
 
08/26 1108  62  154/76     
 
08/26 0939 97.8 65 16 158/94  94   
 
08/26 0638 98.0 83 20 142/84  94 Room Air  
 
08/25 2213 98.0 74 18 146/80  95 Room Air  
 
08/25 1519 98.2 71 18 140/80  94 Room Air  
 
 
 Intake & Output
 
 
 08/26 1600 08/26 0800 08/26 0000 08/25 1600 08/25 0800 08/25 0000
 
Intake Total  220 400  240 0
 
Output Total      
 
Balance  220 400  240 0
 
       
 
Intake, IV     120 
 
Intake, Oral  220 400  120 0
 
Patient      165 lb
 
Weight      
 
Weight      Bed scale
 
Measurement      
 
Method      
 
 
 
Physical Exam:
General Appearance: Alert, Oriented X3, Cooperative, No Acute Distress
Neck: supple, trachea midline, no JVD
Cardiovascular: Regular Rate, Normal S1, Normal S2, no murmur
Lungs: Clear to Auscultation, Normal Air Movement
Abdomen: Normal Bowel Sounds, Soft, No Tenderness
Extremities: No Clubbing, No Cyanosis, No Edema, good capillary refill 
bilaterally
Labs/Andre Results:
 Laboratory Tests
 
 
 08/26 08/26 08/25 08/25
 
 1005 0625 1034 0617
 
Chemistry    
 
  Troponin I (< 0.11 ng/ml) Pending   < 0.01
 
Coagulation    
 
  APTT (25 - 37 SEC)   74  H 
 
Hematology    
 
  CBC w Diff  NO MAN DIFF REQ  
 
  WBC (4.8 - 10.8 /CUMM)  3.8  L  
 
  RBC (4.20 - 5.40 /CUMM)  3.99  L  
 
  Hgb (12.0 - 16.0 G/DL)  12.1  
 
  Hct (37 - 47 %)  35.3  L  
 
  MCV (81.0 - 99.0 FL)  88.4  
 
  MCH (27.0 - 31.0 PG)  30.4  
 
  MCHC (33.0 - 37.0 G/DL)  34.3  
 
  RDW (11.5 - 14.5 %)  13.6  
 
  Plt Count (130 - 400 /CUMM)  241  
 
  MPV (7.4 - 10.4 FL)  7.4  
 
  Gran % (42.2 - 75.2 %)  51.9  
 
  Lymphocytes % (20.5 - 51.1 %)  38.9  
 
  Monocytes % (1.7 - 9.3 %)  6.2  
 
  Eosinophils % (0 - 5 %)  2.7  
 
  Basophils % (0.0 - 2.0 %)  0.3  
 
  Absolute Granulocytes (1.4 - 6.5 /CUMM)  1.9  
 
  Absolute Lymphocytes (1.2 - 3.4 /CUMM)  1.5  
 
  Absolute Monocytes (0.10 - 0.60 /CUMM)  0.2  
 
  Absolute Eosinophils (0.0 - 0.7 /CUMM)  0.1  
 
  Absolute Basophils (0.0 - 0.2 /CUMM)  0  
 
 
 
 
 08/25 08/24 08/24
 
 0200 2124 1715
 
Chemistry   
 
  Sodium (137 - 145 mmol/L)   137
 
  Potassium (3.5 - 5.1 mmol/L)   3.7
 
  Chloride (98 - 107 mmol/L)   103
 
  Carbon Dioxide (22 - 30 mmol/L)   26
 
  Anion Gap (5 - 16)   8
 
  BUN (7 - 17 mg/dL)   19  H
 
  Creatinine (0.5 - 1.0 mg/dL)   0.8
 
  Estimated GFR (>60 ml/min)   > 60
 
  BUN/Creatinine Ratio (7 - 25 %)   23.8
 
  Glucose (65 - 99 mg/dL)   104  H
 
  Calcium (8.4 - 10.2 mg/dL)   9.5
 
  Total Bilirubin (0.2 - 1.3 mg/dL)   0.3
 
  AST (14 - 36 U/L)   23
 
  ALT (9 - 52 U/L)   34
 
  Alkaline Phosphatase (<127 U/L)   83
 
  Troponin I (< 0.11 ng/ml)  < 0.01 < 0.01
 
  Total Protein (6.3 - 8.2 g/dL)   7.2
 
  Albumin (3.5 - 5.0 g/dL)   4.1
 
  Globulin (1.9 - 4.2 gm/dL)   3.1
 
  Albumin/Globulin Ratio (1.1 - 2.2 %)   1.3
 
Coagulation   
 
  PT (9.4 - 12.5 SEC)   11.8
 
  INR (0.90 - 1.19)   1.08
 
  APTT (25 - 37 SEC) > 120 *H  30
 
  D-Dimer High Sensitivty (0 - 243 ng/ml)   2052  H
 
Hematology   
 
  CBC w Diff   NO MAN DIFF REQ
 
  WBC (4.8 - 10.8 /CUMM)   5.4
 
  RBC (4.20 - 5.40 /CUMM)   4.27
 
  Hgb (12.0 - 16.0 G/DL)   13.0
 
  Hct (37 - 47 %)   37.8
 
  MCV (81.0 - 99.0 FL)   88.6
 
  MCH (27.0 - 31.0 PG)   30.6
 
  MCHC (33.0 - 37.0 G/DL)   34.5
 
  RDW (11.5 - 14.5 %)   13.2
 
  Plt Count (130 - 400 /CUMM)   237
 
  MPV (7.4 - 10.4 FL)   7.1  L
 
  Gran % (42.2 - 75.2 %)   60.6
 
  Lymphocytes % (20.5 - 51.1 %)   31.9
 
  Monocytes % (1.7 - 9.3 %)   5.7
 
  Eosinophils % (0 - 5 %)   1.5
 
  Basophils % (0.0 - 2.0 %)   0.3
 
  Absolute Granulocytes (1.4 - 6.5 /CUMM)   3.2
 
  Absolute Lymphocytes (1.2 - 3.4 /CUMM)   1.7
 
  Absolute Monocytes (0.10 - 0.60 /CUMM)   0.3
 
  Absolute Eosinophils (0.0 - 0.7 /CUMM)   0.1
 
  Absolute Basophils (0.0 - 0.2 /CUMM)   0
 
 
 
 
Assessment/Plan
Assessment/Plan
Pulmonary embolism without precipitating event identified.
Chest pains are secondary to pleural inflammation adjacent to sites of emboli.
No ischemic workup is necessary at this time.
Keep patient on telemetry.
Continue apixaban and heparin overlap another 24 hours at least.
maintain on telemetry for the moment.
 
 
 
Consult Acknowledgment
- Thank you for your consult request.

## 2018-08-26 NOTE — ECHOCARDIOGRAM REPORT
DONY NAPIER 
 
 Age:    72     :    1945      Gender:     F 
 
 MRN:    [id number] 
 
 Exam Date:     2018  
                11:05 
 
 Exam Location:   
 [place] 
 
 Ht (in):     63      Wt (lb):      161     BSA:    1.82 
 
 BP:          120     /     72 
 
 Ordering Physician:        Coty Varela MD 
 
 Referring Physician:       Coty Varela MD 
 
 Technologist:              [facility] 
 
 Room Number:               180-2 
 
 Indications: 
 
 Rhythm:                 Sinus 
 
 Technical Quality:      good 
 
 
 FINDINGS 
 Left Ventricle 
 Normal left ventricular size, wall thickness and systolic function  
 with no obvious regional wall motion abnormalities.  Normal left  
 ventricular diastolic filling pattern for age.  The ejection  
 fraction is visually estimated at    60 %. 
 
 Right Ventricle 
 The right ventricle is normal in size and function. 
 
 Right Atrium 
 The right atrium is normal in size. 
 
 Left Atrium 
 The left atrium is normal in size.  The interatrial septum is  
 intact. 
 
 Mitral Valve 
 The mitral valve is normal in structure and function.  There is  
 trace  mitral regurgitation. 
 
 Aortic Valve 
 Structurally normal aortic valve without significant sclerosis or  
 stenosis.  There is  trace aortic regurgitation. 
 
 Tricuspid Valve 
 The tricuspid valve is normal in structure and function.  There is  
 tarce tricuspid regurgitation.  Pulmonary artery systolic pressure  
 is normal. 
 
 Pulmonic Valve 
 Structurally normal pulmonic valve.  There is trace pulmonic  
 regurgitation. 
 
 Pericardium 
 Normal pericardium without effusion.  No pleural effusion. 
 
 Great Vessels 
 Normal aortic root dimension.  The aortic arch and great vessels are  
 well seen and are normal. 
 
 CONCLUSIONS 
 Normal left ventricular size, wall thickness and systolic function  
 with no obvious regional wall motion abnormalities. 
 Normal left ventricular diastolic filling pattern for age. 
 The ejection fraction is visually estimated at    60 %. 
 The right ventricle is normal in size and function. 
 The right atrium is normal in size. 
 The left atrium is normal in size. 
 There is trace  mitral regurgitation. 
 Structurally normal aortic valve without significant sclerosis or  
 stenosis. 
 There is  trace aortic regurgitation. 
 There is tarce tricuspid regurgitation. 
 Pulmonary artery systolic pressure is normal. 
 There is trace pulmonic regurgitation. 
 Normal pericardium without effusion. 
 Normal aortic root dimension. 
 
 Kath Mckeon M.D. 
 (Electronically Signed) 
 Final Date:      2018  
                  16:34 
 
 MEASUREMENTS  (Male / Female) Normal Values 
 
 2D ECHO 
 LV Diastolic Diameter PLAX                          4.4 cm           4.2 - 5.9 
/ 3.9 - 5.3 cm 
 LV Systolic Diameter PLAX                           3.1 cm           2.1 - 4.0 
cm 
 LV Fractional Shortening PLAX                       29.5 %           25 - 46  %
 
 LV Ejection Fraction 2D Teich                       56.8 %            
 IVS Diastolic Thickness                             1.3 cm            
 LVPW Diastolic Thickness                            1.2 cm            
 LV Relative Wall Thickness                          0.6               
 LVOT Diameter                                       2.0 cm            
 Aortic Root Diameter                                3.0 cm            
 LA Systolic Diameter LX                             3.1 cm           3.0 - 4.0 
/ 2.7 - 3.8 cm 
 LA Volume                                           54.0 cm         18 - 58 / 
22 - 52 cm 
 
 DOPPLER 
 AV Peak Velocity                                    167.0 cm/s        
 AV Peak Gradient                                    11.2 mmHg         
 LVOT Peak Velocity                                  96.3 cm/s         
 LVOT Peak Gradient                                  3.7 mmHg          
 AV Area Cont Eq pk                                  1.8 cm           
 Mitral E Point Velocity                             44.9 cm/s         
 Mitral A Point Velocity                             79.5 cm/s         
 Mitral E to A Ratio                                 0.6               
 MV Deceleration Time                                401.0 ms          
 PV Peak Velocity                                    96.4 cm/s         
 PV Peak Gradient                                    3.7 mmHg          
 LV E' Lateral Velocity                              6.5 cm/s          
 Mitral E to LV E' Lateral Ratio                     6.9               
 LV E' Septal Velocity                               4.9 cm/s          
 Mitral E to LV E' Septal Ratio                      9.2
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
## 2018-08-27 VITALS — SYSTOLIC BLOOD PRESSURE: 162 MMHG | DIASTOLIC BLOOD PRESSURE: 80 MMHG

## 2018-08-27 VITALS — DIASTOLIC BLOOD PRESSURE: 88 MMHG | SYSTOLIC BLOOD PRESSURE: 160 MMHG

## 2018-08-27 LAB — APTT BLD: 33 SEC (ref 25–37)

## 2018-08-27 NOTE — PN- HOUSESTAFF
Lovely Black 18 0708:
Subjective
Follow-up For:
Shortness of breath
Subjective:
Overnight patient was in sinus bradycardia rhythm heart rate range between 56-
68.  Patient is complaining of back pain which she states has been going on for 
the past month, states has slightly worsened today. Patient states she has a 
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 
is worse in the mornings, denies fever, night sweats, chills, cough.
 
Review of Systems
Constitutional:
Denies: chills, diaphoresis, fever. 
Cardiovascular:
Reports: chest pain. 
Respiratory:
Denies: cough, short of breath, sputum production. 
Gastrointestinal:
Denies: abdominal pain, bloody stool. 
Musculoskeletal:
Reports: back pain. 
 
Objective
Last 24 Hrs of Vital Signs/I&O
 Vital Signs
 
 
Date Time Temp Pulse Resp B/P B/P Pulse O2 O2 Flow FiO2
 
     Mean Ox Delivery Rate 
 
 0718 98.6 57 18 160/88  98 Room Air  
 
 2217 98.1 65 18 158/76  95 Room Air  
 
 1529 97.9 66 18 134/70  95   
 
 1109    154/76     
 
 1108  62  154/76     
 
 0939 97.8 65 16 158/94  94   
 
 0800       Room Air Room Air 
 
 
 Intake & Output
 
 
  0800  0000  1600
 
Intake Total   500
 
Output Total   
 
Balance   500
 
    
 
Intake, Oral   500
 
Patient  166 lb 
 
Weight   
 
 
 
 
Physical Exam
General Appearance: Alert, Oriented X3, Cooperative
HEENT: Atraumatic, PERRLA, EOMI
Cardiovascular: Regular Rate, Normal S1, Normal S2
Lungs: Clear to Auscultation, Normal Air Movement
Abdomen: Normal Bowel Sounds, Soft, No Tenderness
Extremities: No Cyanosis, No Edema, Normal Pulses
 
Assessment/Plan
Assessment:
2F with a PMH of  hypertension, diabetes mellitus, GERD, osteoporosis, OA, 
anxiety, Presenting to the ED for a 4 week history of worsening dyspnea with 
minimal exertion accompanied by episodes of chest pains radiating to the back.
Patient was found to have bilateral pulmonary embolisms and was started on 
heparin.
 
Problems:
B/L unprovoked pulmonary embolism
 
Plan:
-her complaint of CP and upper back pain inc. from 6/10 to 8/10 was evaluated, 
it appears that pain is most likely related to pulmonary embolisms leading to 
pleuritis pain does not seem to be cardiac in nature further supported by 
characteristics of pain.  EKG and troponins have been negative to date
-No ischemic workup is necessary at this time, as per cardiology
-We will discharge patient home today with instructions to take Eliquis 20 mg 
daily until , then 10 mg daily.  Will send referral to hematology Dr. Alva for pulmonary embolus workup, follow-up with Dr. Mckeon and PCP.
Problem List:
 1. Pulmonary emboli
 
Pain Ratin
Pain Location:
chest
Pain Goal: Remain pain free
Pain Plan:
tylenol
Tomorrow's Labs & Rationales:
none
 
 
Colby OROZCO,Robin 18 1121:
Attending MD Review Statement
 
Attending Statement
Attending MD Statement: examined this patient, discuss w/resident/PA/NP, agreed 
w/resident/PA/NP, reviewed EMR data (avail), discussed with nursing, discussed 
with case mgmt, amended to note
Attending Assessment/Plan:
Patient seen and examined.  Resting comfortably not in any acute distress.  No 
events on telemetry monitoring overnight.  She continues to complain of some 
chest discomfort.  He was some discomfort in the back as well.  This is likely 
related to a pulmonary embolism.  She shows no ischemic changes on the EKG and 
her troponins are negative.  No further ischemic workup is recommended by the 
cardiology service at this time.  We will continue anticoagulation therapy with 
Eliquis.  She is medically stable to be discharged home today.  She will be 
referred to the hematology service as an outpatient for follow-up of her venous 
thrombosis.  She reports that she is up-to-date with all her age-appropriate 
cancer screening.

## 2018-08-27 NOTE — PATIENT DISCHARGE INSTRUCTIONS
Discharge Instructions
 
General Discharge Information
You were seen/treated for:
Pulmonary embolism
Watch for these problems:
Fever, chest pain, palpitations, shortness of breath
Special Instructions:
Please take 20mg (total of 4 pills) of Eliquis daily until Sept 1, then take 
10mg daily (total of 2 pills)  Please follow-up with PCP, Hematologist Dr. Alva and cardiologist Dr. Mckeon
 
Diet
Continue normal diet: Yes
Recommended Diet: Heart Healthy
 
Activity
Full Activity/No Limits: Yes
 
Acute Coronary Syndrome
 
Inclusion Criteria
At DC or during hospital stay patient has or had the following:
ACS DIAGNOSIS No
 
Discharge Core Measures
Meds if any: Prescribed or Continued at Discharge
Meds if any: NOT Prescribed or Continued at Discharge
 
Congestive Heart Failure
 
Inclusion Criteria
At DC or during hospital stay patient has or had the following:
CHF DIAGNOSIS No
 
Discharge Core Measures
Meds if any: Prescribed or Continued at Discharge
Meds if any: NOT Prescribed or Continued at Discharge
 
Cerebrovascular accident
 
Inclusion Criteria
At DC or during hospital stay patient has or had the following:
CVA/TIA Diagnosis No
 
Discharge Core Measures
Meds if any: Prescribed or Continued at Discharge
Meds if any: NOT Prescribed or Continued at Discharge
 
Venous thromboembolism
 
Inclusion Criteria
VTE Diagnosis Yes
VTE Type Pulmonary Embolism
VTE Confirmed by (Test) CT CHEST ANGIOGRAM
 
Discharge Core Measures
- Per Current guidelines, there needs to be overlap
- treatment for the first 5 days of Warfarin therapy.
- If discharged on Warfarin prior to 5 days of
- overlap therapy, the patient will need to be
- assessed for post discharge needs including
- *Post discharge parental anticoagulation
- *Warfarin and/or parental anticoagulation education
- *Follow up date to check INR post discharge
At least 5 days overlap therapy as Inpatient Yes
Meds if any: Prescribed or Continued at Discharge
Warfarin No
Overlap Therapy Yes
Note: Overlap Therapy is Warfarin and Anticoagulant
Meds if any: NOT Prescribed or Continued at Discharge
No Warfarin d/t Prescribed other Anticoag

## 2018-08-29 NOTE — DISCHARGE SUMMARY
Visit Information
 
Visit Dates
Admission Date:
08/24/18
 
Discharge Date:
08/27/18
 
 
Hospital Course
 
Course
Attending Physician:
Robin Bowman MD
 
Primary Care Physician:
Jairo Campbell MD
 
Hospital Course:
Ms. Martin is a 72F with a PMH of  hypertension, diabetes mellitus, GERD, 
osteoporosis, OA, anxiety. She presented ED on 8/24/2018 for a 4 week history of
worsening dyspnea with minimal exertion accompanied by episodes of chest pains 
radiating to the back.  CTA of the chest in ED was positive for bilateral 
pulmonary embolism started on heparin.
 
Problems treated for an telemetry aragon:
Bilateral unprovoked pulmonary embolism
CT of the chest on 8/24/2018 was positive for bilateral pulmonary embolism, 
bilateral lower extremity ultrasound was negative for DVTs.  After findings of 
CTA chest patient was started on IV heparin drip, and eventually she was 
transitioned to Eliquis 10 mg twice daily on August 25th through September 1.  
Patient was instructed to transition to 5 mg twice daily from September 1 
onwards.
 
Chest pain
EKG and troponins were ordered and trended both of which were negative.  
Cardiology was consulted for further workup of chest pain echocardiogram was 
obtained on 8/26/2018, which showed a left ventricular ejection fraction of is 
60%, normal left ventricular diastolic this filling pattern, normal size wall 
thickness and systolic function with no regional wall motion abnormalities.  
Rest of echocardiogram was normal.  With cardiology workup negative for ischemic
disease it was concluded that patient's chest pain was secondary to  pleuritis 
caused by the pulmonary embolism. 
 
 
 
 
Patient was discharged home on 8/27/2018 with instructions to take Eliquis 20 mg
daily until September 1, then 10 mg daily.  Will send referral to hematology Dr. Alva for pulmonary embolus workup, follow-up with Dr. Mckeon and PCP.
Allergies:
Coded Allergies:
ACE Inhibitors (COUGH 08/24/18)
bee venom protein (honey bee) (WELTS, DYSPNEA 08/24/18)
 
 
Disposition Summary
 
Disposition
Principal Diagnosis:
Bilateral unprovoked pulmonary embolisms
Chest pain
Hypertension
Depression
GERD
Additional Diagnosis:
Chest pain
Hypertension
Depression
GERD
Discharge Disposition: home or self care
 
Discharge Instructions
 
General Discharge Information
Code Status: Full Code
Patient's Diet:
regular 
Patient's Activity:
as tolerated
Follow-Up Instructions/Appts:
Patient was discharged home on 8/27/2018 with instructions to take Eliquis 20 mg
daily until September 1, then 10 mg daily.  Will send referral to hematology Dr. Alva for pulmonary embolus workup, follow-up with Dr. Mckeon and PCP.
 
Medications at Discharge
Discharge Medications:
Stop taking the following medications:
Fluoxetine HCl (Fluoxetine HCl) 20 MG CAPSULE ORAL DAILY 
 
Fluoxetine HCl (Fluoxetine HCl) 40 MG CAPSULE ORAL DAILY 
 
Continue taking these medications:
Tylenol With Codeine (Tylenol With Codeine #3 Tablet) 300 MG-30 MG TABLET
    1 Tablet ORAL 2 x Daily as needed as needed for PAIN
    Comments:
       NOT GIVEN WHILE IN HOSPITAL
       BUT TYLENOL 650 MG GIVEN Last Taken:8/27/18
             Time: 9:00 AM
 
Nebivolol HCl (Bystolic) 10 MG TABLET
    1 Tablet ORAL DAILY
 
Dexlansoprazole (Dexilant) 60 MG CAP.BP
    1 Capsule ORAL TWICE DAILY
    Comments:
       DID NOT RECEIVE WHILE IN HOSPITAL
 
Diclofenac Sodium (Diclofenac Sodium) 75 MG TABLET.DR
    1 Tablet ORAL TWICE DAILY
    Instructions:
       USE CAUTIOUSLY AS PATIENT IS ON ELIQUIS, NSAIDS CAN CONTRIBUTE TO
       BLEEDING
    Comments:
       DID NOT RECEIVE WHILE IN HOSPITAL
 
Epinephrine (Epipen 2-Campos) 0.3 MG/0.3 ML AUTO.INJCT
    0.3 Milligram INTRAMUSC As Directed as needed for ALLERGIC REACTION
    Comments:
       DID NOT RECEIVE WHILE IN HOSPITAL
 
Irbesartan (Avapro) 300 MG TABLET
    1 Tablet ORAL DAILY
    Comments:
       Last Taken:8/27/18
             Time: 9:00 AM
       SUBSTITUTED WHILE IN HOSPITAL FOR [place]ARTAN 100 MG DAILY
 
Calcium Carbonate/Vitamin D3 (Calcium 500 + D Tablet) (Unknown Strength) TABLET
    Unknown Dose ORAL DAILY
    Comments:
       DID NOT RECEIVE WHILE IN HOSPITAL
 
Ascorbate Calcium (Vitamin C) (Unknown Strength) TABLET
    Unknown Dose ORAL DAILY
    Comments:
       DID NOT RECEIVE WHILE IN HOSPITAL
 
Fluoxetine HCl (Fluoxetine HCl) 60 MG TABLET
    1 Tablet ORAL DAILY
    Comments:
       Last Taken:8/27/18
             Time: 9:00 AM
 
Gabapentin (Neurontin) 300 MG CAPSULE
    2 Capsule ORAL THREE TIMES DAILY
    Qty = 30
    Comments:
       NOT GIVEN WHILE IN HOSPITAL
    This prescription has been renewed
 
Start taking the following new medications:
Apixaban (Eliquis) 5 MG TABLET
    5 Milligram ORAL TWICE DAILY
    Qty = 40
    No Refills
    Instructions:
       TAKE 20MG/DAY TOTAL 4 PILLS UNTIL SEPT 1ST. THEN TAKE 10MG/DAY TOTAL 2
       PILLS PER DAY
    Comments:
       Last Taken:8/27/18
             Time: 9:00 AM
 
 
Copies To:
Linda OROZCO,Kath; Adrian OROZCO,Jairo SHARP